# Patient Record
Sex: FEMALE | URBAN - METROPOLITAN AREA
[De-identification: names, ages, dates, MRNs, and addresses within clinical notes are randomized per-mention and may not be internally consistent; named-entity substitution may affect disease eponyms.]

---

## 2024-02-08 ENCOUNTER — OFFICE VISIT (OUTPATIENT)
Age: 59
End: 2024-02-08
Payer: COMMERCIAL

## 2024-02-08 VITALS
SYSTOLIC BLOOD PRESSURE: 114 MMHG | WEIGHT: 158.2 LBS | HEART RATE: 77 BPM | HEIGHT: 71 IN | DIASTOLIC BLOOD PRESSURE: 77 MMHG | OXYGEN SATURATION: 97 % | BODY MASS INDEX: 22.15 KG/M2 | RESPIRATION RATE: 18 BRPM

## 2024-02-08 DIAGNOSIS — R25.8 CLONUS: ICD-10-CM

## 2024-02-08 DIAGNOSIS — R29.2 HYPER REFLEXIA: ICD-10-CM

## 2024-02-08 DIAGNOSIS — M47.816 LUMBAR SPONDYLOSIS: ICD-10-CM

## 2024-02-08 DIAGNOSIS — M47.814 THORACIC SPONDYLOSIS: Primary | ICD-10-CM

## 2024-02-08 PROCEDURE — 99204 OFFICE O/P NEW MOD 45 MIN: CPT | Performed by: PHYSICAL MEDICINE & REHABILITATION

## 2024-02-08 RX ORDER — CHOLECALCIFEROL (VITAMIN D3) 1250 MCG
CAPSULE ORAL
COMMUNITY

## 2024-02-08 RX ORDER — MAGNESIUM 30 MG
30 TABLET ORAL 2 TIMES DAILY
COMMUNITY

## 2024-02-08 RX ORDER — VENLAFAXINE HYDROCHLORIDE 75 MG/1
75 CAPSULE, EXTENDED RELEASE ORAL DAILY
COMMUNITY
Start: 2023-11-13

## 2024-02-08 RX ORDER — METHYLPREDNISOLONE 4 MG/1
TABLET ORAL
Qty: 1 KIT | Refills: 0 | Status: SHIPPED | OUTPATIENT
Start: 2024-02-08

## 2024-02-08 NOTE — PATIENT INSTRUCTIONS
Low Back Pain: Exercises  Introduction  Here are some examples of exercises for you to try. The exercises may be suggested for a condition or for rehabilitation. Start each exercise slowly. Ease off the exercises if you start to have pain.  You will be told when to start these exercises and which ones will work best for you.  How to do the exercises  Press-up    Lie on your stomach, supporting your body with your forearms.  Press your elbows down into the floor to raise your upper back. As you do this, relax your stomach muscles and allow your back to arch without using your back muscles. As your press up, do not let your hips or pelvis come off the floor.  Hold for 15 to 30 seconds, then relax.  Repeat 2 to 4 times.  Alternate arm and leg (bird dog) exercise    Do this exercise slowly. Try to keep your body straight at all times, and do not let one hip drop lower than the other.  Start on the floor, on your hands and knees.  Tighten your belly muscles.  Raise one leg off the floor, and hold it straight out behind you. Be careful not to let your hip drop down, because that will twist your trunk.  Hold for about 6 seconds, then lower your leg and switch to the other leg.  Repeat 8 to 12 times on each leg.  Over time, work up to holding for 10 to 30 seconds each time.  If you feel stable and secure with your leg raised, try raising the opposite arm straight out in front of you at the same time.  Knee-to-chest exercise    Lie on your back with your knees bent and your feet flat on the floor.  Bring one knee to your chest, keeping the other foot flat on the floor (or keeping the other leg straight, whichever feels better on your lower back).  Keep your lower back pressed to the floor. Hold for at least 15 to 30 seconds.  Relax, and lower the knee to the starting position.  Repeat with the other leg. Repeat 2 to 4 times with each leg.  To get more stretch, put your other leg flat on the floor while pulling your knee

## 2024-02-08 NOTE — PROGRESS NOTES
Gena W Bridgettenatalie presents today for   Chief Complaint   Patient presents with    Back Problem    Pain    Back Pain       Is someone accompanying this pt? no    Is the patient using any DME equipment during OV? no    Depression Screening:       No data to display                Learning Assessment:      Abuse Screening:       No data to display                Fall Risk      OPIOID RISK TOOL      Coordination of Care:  1. Have you been to the ER, urgent care clinic since your last visit? no  Hospitalized since your last visit? no    2. Have you seen or consulted any other health care providers outside of the [facility] since your last visit? no Include any pap smears or colon screening. no      
few months.  This pain feels different.  Patient reports that she used to walk 3 miles around 4-7 times a week without pain, but now she can't handle walking long distances.  Denies fevers, chills, weight changes, trauma, infections.  Denies urinary retention or incontinence.    Patient has tried taking Ibuprofen, Tylenol, and aspirin with no benefit. She describes relief from heating pad.  No corticosteroids for this problem.  She has had oral steroids in the past with good benefit.    Reviewed prior lumbar MRI as well as thoracic and lumbar x-rays from last month.    Patient denies persistent fevers, chills, weight changes, saddle paresthesias, and neurogenic bowel or bladder symptoms. Patient denies any major weight changes or night sweats. Denies any recent illnesses or infections.           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         PHYSICAL EXAM    /77 (Site: Left Upper Arm, Position: Sitting, Cuff Size: Medium Adult)   Pulse 77   Resp 18   Ht 1.803 m (5' 11\")   Wt 71.8 kg (158 lb 3.2 oz)   SpO2 97% Comment: RA  BMI

## 2024-02-13 ENCOUNTER — TELEPHONE (OUTPATIENT)
Age: 59
End: 2024-02-13

## 2024-02-13 NOTE — TELEPHONE ENCOUNTER
Patient called and states she would like for her MRI order to be sent over to a [facility] facility.        Please call and advise patient when order is sent over   [phone]

## 2024-02-14 DIAGNOSIS — R25.8 CLONUS: ICD-10-CM

## 2024-02-14 DIAGNOSIS — M47.814 THORACIC SPONDYLOSIS: ICD-10-CM

## 2024-02-14 DIAGNOSIS — R29.2 HYPER REFLEXIA: ICD-10-CM

## 2024-03-11 ENCOUNTER — TELEPHONE (OUTPATIENT)
Age: 59
End: 2024-03-11

## 2024-03-11 NOTE — TELEPHONE ENCOUNTER
The pt called the office and was offered the appt for Thursday at 1540 with Dr. Birmingham. The pt declined this appt time because this is the appt that she canceled. Her  is having a procedure done that day. As of right now, the results for the MRI have not been read by [facility]. They are not available in care everywhere yet. The pt wanted to know if someone will call her with her results. I explained to the pt that this is why we are scheduling a follow up appt. She verbalized understanding and has no other questions at this time. Will continue to watch the schedule for other open appts.

## 2024-03-11 NOTE — TELEPHONE ENCOUNTER
Patient states that she has already completed her MRI which was on 3/9/24 and had to reschedule her 3/14 MRI follow up with Dr. Birmingham because her  is having surgery on 3/14.     Patient was rescheduled to Dr. Birmingham next available which was 5/16/24.    Patient wants to know if someone will discuss the results of her MRI before 5/16.    Patient tel: [phone]

## 2024-03-11 NOTE — TELEPHONE ENCOUNTER
Attempted to contact the pt regarding her message. She was not able to be reached. A message was left for the pt asking her to contact the office regarding her message. The number to the office was provided.

## 2024-03-13 ENCOUNTER — TELEPHONE (OUTPATIENT)
Age: 59
End: 2024-03-13

## 2024-03-13 DIAGNOSIS — R25.8 CLONUS: ICD-10-CM

## 2024-03-13 DIAGNOSIS — G96.198 DURAL ECTASIA: ICD-10-CM

## 2024-03-13 DIAGNOSIS — M35.9 CONNECTIVE TISSUE DISORDER (HCC): ICD-10-CM

## 2024-03-13 DIAGNOSIS — R29.2 HYPER REFLEXIA: Primary | ICD-10-CM

## 2024-03-13 NOTE — TELEPHONE ENCOUNTER
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

## 2024-03-13 NOTE — TELEPHONE ENCOUNTER
I called and spoke to the pt. The pt was identified using 2 pt identifiers. She was offered an appt next week with Dr. Birmingham to go over her MRI results. The report is finally able to be reviewed in care everywhere. The pt verbalized understanding and states that she already has an appt for 03/20/24 at 1020. The pt was advised to bring the MRI CD with her to the appt. She states that she was not given this by the radiology dept. I advised the pt to contact them and request a copy for her appt next week. She verbalized understanding and has no questions or concerns at this time.

## 2024-03-15 NOTE — TELEPHONE ENCOUNTER
Spoke w/patient regarding MRI findings of dural ectasia. This is consistent with patient's report of connective tissue disorder. 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  Denies hx of Marfans. Reports chronic HA. Pt has seen cardiology and is scheduled for MRA.   Given hyper reflexia w/clonus at OV, will refer to [facility].  Advised to avoid contact sports, roller coasters, etc.

## 2024-03-18 ENCOUNTER — TELEPHONE (OUTPATIENT)
Age: 59
End: 2024-03-18

## 2024-03-18 NOTE — TELEPHONE ENCOUNTER
I spoke to Dr. Birmingham. She states that the pt does not need to come to her 03/20/24 appt. I called and spoke to the pt. The pt was identified using 2 pt identifiers. She was notified of this. The pt asked about the neurosurgery referral. I let her know that the referral coordinator is not in the office on Fridays. She will be faxing it within the next day or so. I do see that the provider has marked the referral as urgent. I will send her a message to bring this to her attention. The pt verbalized understanding and has no questions or concerns at this time.

## 2024-03-18 NOTE — TELEPHONE ENCOUNTER
Patient called to ask if she still needs to come in on 03/20 being that Dr. Birmingham already called her last week and she also asked about the status of the referral to the Neurosurgeon and who she was referred to.    Patient is requesting call back at [phone].